# Patient Record
Sex: FEMALE | Race: WHITE
[De-identification: names, ages, dates, MRNs, and addresses within clinical notes are randomized per-mention and may not be internally consistent; named-entity substitution may affect disease eponyms.]

---

## 2019-10-12 ENCOUNTER — HOSPITAL ENCOUNTER (EMERGENCY)
Dept: HOSPITAL 5 - ED | Age: 34
Discharge: HOME | End: 2019-10-12
Payer: SELF-PAY

## 2019-10-12 VITALS — DIASTOLIC BLOOD PRESSURE: 53 MMHG | SYSTOLIC BLOOD PRESSURE: 124 MMHG

## 2019-10-12 DIAGNOSIS — N39.0: Primary | ICD-10-CM

## 2019-10-12 PROCEDURE — 99282 EMERGENCY DEPT VISIT SF MDM: CPT

## 2019-10-12 NOTE — EMERGENCY DEPARTMENT REPORT
ED Female  HPI





- General


Chief complaint: Abdominal Pain


Stated complaint: BLOOD IN URINE/STOMACH PAIN


Time Seen by Provider: 10/12/19 09:05


Source: family


Mode of arrival: Ambulatory


Limitations: No Limitations





- History of Present Illness


Initial comments: 





Antonia is a 34 yo healthy female who presents with dysuria since Tuesday.  Now 

has hematuria.  Moderately severe discomfort.  +back pain.  No fever. No 

vomiting.  Exquisite burning during urine stream.


MD Complaint: dysuria


-: Gradual, days(s) (5)


Location: suprapubic


Radiation: non-radiating


Severity: moderate


Severity scale (0 -10): 7


Quality: sharp, burning


Consistency: intermittent


Worsens with: urination


Associated Symptoms: hematuria





- Related Data


                                  Previous Rx's











 Medication  Instructions  Recorded  Last Taken  Type


 


Phenazopyridine [Pyridium] 200 mg PO TID 2 Days #6 tab 10/12/19 Unknown Rx


 


cephALEXin [Keflex] 500 mg PO QID 7 Days #28 capsule 10/12/19 Unknown Rx











                                    Allergies











Allergy/AdvReac Type Severity Reaction Status Date / Time


 


No Known Allergies Allergy   Unverified 10/12/19 09:00














ED Review of Systems


ROS: 


Stated complaint: BLOOD IN URINE/STOMACH PAIN


Other details as noted in HPI





Constitutional: denies: fever, malaise


Respiratory: denies: cough, shortness of breath


Cardiovascular: denies: chest pain


Gastrointestinal: denies: nausea, vomiting, diarrhea, constipation


Genitourinary: urgency, dysuria, frequency, hematuria


Musculoskeletal: back pain


Neurological: denies: headache





ED Past Medical Hx





- Past Medical History


Previous Medical History?: Yes





- Surgical History


Past Surgical History?: No





- Social History


Smoking Status: Never Smoker





- Medications


Home Medications: 


                                Home Medications











 Medication  Instructions  Recorded  Confirmed  Last Taken  Type


 


Phenazopyridine [Pyridium] 200 mg PO TID 2 Days #6 tab 10/12/19  Unknown Rx


 


cephALEXin [Keflex] 500 mg PO QID 7 Days #28 capsule 10/12/19  Unknown Rx














ED Physical Exam





- General


Limitations: No Limitations


General appearance: alert, in no apparent distress





- Head


Head exam: Present: atraumatic, normocephalic





- Eye


Eye exam: Present: normal appearance





- ENT


ENT exam: Present: mucous membranes moist





- Neck


Neck exam: Present: normal inspection, full ROM





- Respiratory


Respiratory exam: Present: normal lung sounds bilaterally.  Absent: respiratory 

distress, wheezes, rales, rhonchi, stridor





- Cardiovascular


Cardiovascular Exam: Present: regular rate, normal rhythm, normal heart sounds. 

 Absent: systolic murmur, diastolic murmur, rubs, gallop





- GI/Abdominal


GI/Abdominal exam: Present: soft, normal bowel sounds.  Absent: distended, 

tenderness, guarding, rebound





- Extremities Exam


Extremities exam: Present: normal inspection





- Back Exam


Back exam: Present: normal inspection, full ROM.  Absent: tenderness, CVA 

tenderness (R), CVA tenderness (L)





- Neurological Exam


Neurological exam: Present: alert, oriented X3





- Psychiatric


Psychiatric exam: Present: normal affect, normal mood





- Skin


Skin exam: Present: warm, dry, intact, normal color.  Absent: rash





ED Course





                                   Vital Signs











  10/12/19





  08:55


 


Temperature 98.1 F


 


Pulse Rate 96 H


 


Respiratory 19





Rate 


 


Blood Pressure 124/53





[Right] 


 


O2 Sat by Pulse 97





Oximetry 














ED Medical Decision Making





- Medical Decision Making





Acute UTI  Prescription for Pyridium and cephalexin provided


Critical care attestation.: 


If time is entered above; I have spent that time in minutes in the direct care 

of this critically ill patient, excluding procedure time.








ED Disposition


Clinical Impression: 


 Urinary tract infection





Disposition: DC-01 TO HOME OR SELFCARE


Is pt being admited?: No


Does the pt Need Aspirin: No


Condition: Stable


Instructions:  Urinary Tract Infection in Women (ED)


Prescriptions: 


cephALEXin [Keflex] 500 mg PO QID 7 Days #28 capsule


Phenazopyridine [Pyridium] 200 mg PO TID 2 Days #6 tab


Referrals: 


[facility] [Outside] - 3-5 Days


Forms:  Work/School Release Form(ED)


Print Language: Arabic